# Patient Record
Sex: FEMALE | Race: WHITE | Employment: UNEMPLOYED | URBAN - NONMETROPOLITAN AREA
[De-identification: names, ages, dates, MRNs, and addresses within clinical notes are randomized per-mention and may not be internally consistent; named-entity substitution may affect disease eponyms.]

---

## 2022-07-22 ENCOUNTER — APPOINTMENT (OUTPATIENT)
Dept: CT IMAGING | Age: 42
End: 2022-07-22
Payer: COMMERCIAL

## 2022-07-22 ENCOUNTER — HOSPITAL ENCOUNTER (EMERGENCY)
Age: 42
Discharge: HOME | End: 2022-07-22
Attending: EMERGENCY MEDICINE
Payer: COMMERCIAL

## 2022-07-22 VITALS
TEMPERATURE: 97.7 F | SYSTOLIC BLOOD PRESSURE: 119 MMHG | DIASTOLIC BLOOD PRESSURE: 78 MMHG | OXYGEN SATURATION: 98 % | RESPIRATION RATE: 15 BRPM | HEART RATE: 72 BPM

## 2022-07-22 DIAGNOSIS — S09.90XA INJURY OF HEAD, INITIAL ENCOUNTER: Primary | ICD-10-CM

## 2022-07-22 DIAGNOSIS — S16.1XXA STRAIN OF NECK MUSCLE, INITIAL ENCOUNTER: ICD-10-CM

## 2022-07-22 DIAGNOSIS — S39.012A BACK STRAIN, INITIAL ENCOUNTER: ICD-10-CM

## 2022-07-22 PROCEDURE — 96375 TX/PRO/DX INJ NEW DRUG ADDON: CPT

## 2022-07-22 PROCEDURE — 72131 CT LUMBAR SPINE W/O DYE: CPT

## 2022-07-22 PROCEDURE — 72125 CT NECK SPINE W/O DYE: CPT

## 2022-07-22 PROCEDURE — 72192 CT PELVIS W/O DYE: CPT

## 2022-07-22 PROCEDURE — 70450 CT HEAD/BRAIN W/O DYE: CPT

## 2022-07-22 PROCEDURE — 6360000002 HC RX W HCPCS: Performed by: EMERGENCY MEDICINE

## 2022-07-22 PROCEDURE — 72128 CT CHEST SPINE W/O DYE: CPT

## 2022-07-22 PROCEDURE — 96374 THER/PROPH/DIAG INJ IV PUSH: CPT

## 2022-07-22 PROCEDURE — 99284 EMERGENCY DEPT VISIT MOD MDM: CPT

## 2022-07-22 RX ORDER — ONDANSETRON 2 MG/ML
4 INJECTION INTRAMUSCULAR; INTRAVENOUS ONCE
Status: COMPLETED | OUTPATIENT
Start: 2022-07-22 | End: 2022-07-22

## 2022-07-22 RX ORDER — GABAPENTIN 600 MG/1
600 TABLET ORAL 3 TIMES DAILY
COMMUNITY

## 2022-07-22 RX ORDER — FENTANYL CITRATE 50 UG/ML
25 INJECTION, SOLUTION INTRAMUSCULAR; INTRAVENOUS ONCE
Status: COMPLETED | OUTPATIENT
Start: 2022-07-22 | End: 2022-07-22

## 2022-07-22 RX ADMIN — FENTANYL CITRATE 25 MCG: 50 INJECTION, SOLUTION INTRAMUSCULAR; INTRAVENOUS at 20:40

## 2022-07-22 RX ADMIN — ONDANSETRON 4 MG: 2 INJECTION INTRAMUSCULAR; INTRAVENOUS at 20:40

## 2022-07-22 ASSESSMENT — PAIN SCALES - GENERAL: PAINLEVEL_OUTOF10: 9

## 2022-07-22 ASSESSMENT — PAIN DESCRIPTION - ORIENTATION: ORIENTATION: LOWER

## 2022-07-22 ASSESSMENT — PAIN DESCRIPTION - LOCATION: LOCATION: BACK

## 2022-07-23 NOTE — ED PROVIDER NOTES
CHOLECYSTECTOMY      CYST REMOVAL      HYSTERECTOMY (CERVIX STATUS UNKNOWN)           CURRENT MEDICATIONS       Discharge Medication List as of 7/22/2022 10:43 PM        CONTINUE these medications which have NOT CHANGED    Details   gabapentin (NEURONTIN) 600 MG tablet Take 600 mg by mouth in the morning and 600 mg at noon and 600 mg before bedtime. Historical Med             ALLERGIES     Patient has no known allergies. FAMILY HISTORY     History reviewed. No pertinent family history. SOCIAL HISTORY       Social History     Socioeconomic History    Marital status: Single     Spouse name: None    Number of children: None    Years of education: None    Highest education level: None   Tobacco Use    Smoking status: Former     Types: Cigarettes       SCREENINGS        Elmer Coma Scale  Eye Opening: Spontaneous  Best Verbal Response: Oriented  Best Motor Response: Obeys commands  Loving Coma Scale Score: 15               PHYSICAL EXAM    (up to 7 for level 4, 8 or more for level 5)     ED Triage Vitals [07/22/22 2016]   BP Temp Temp Source Heart Rate Resp SpO2 Height Weight   119/78 97.7 °F (36.5 °C) Tympanic 72 15 98 % -- --       Physical Exam  Vitals and nursing note reviewed. Constitutional:       Appearance: Normal appearance. HENT:      Head: Normocephalic. Comments: Negative summers signs negative raccoon's eyes    No otorhinorrhea     Nose: Nose normal.      Mouth/Throat:      Mouth: Mucous membranes are moist.      Comments: No airway compromise  Eyes:      Extraocular Movements: Extraocular movements intact. Pupils: Pupils are equal, round, and reactive to light.       Comments: No photophobia   Neck:      Comments: Patient is fully immobilized with a hard cervical collar in place    After the patient's collar was removed and cleared from CT cervical spine    Cervical spine some nonspecific tenderness noted neck is without JVD the trachea is midline no subcu crepitus  Cardiovascular:      Rate and Rhythm: Normal rate and regular rhythm. Pulses: Normal pulses. Heart sounds: Normal heart sounds. Pulmonary:      Effort: Pulmonary effort is normal.      Breath sounds: Normal breath sounds. Comments: Chest wall is without appreciable tenderness, no subcu crepitus  Abdominal:      General: Abdomen is flat. Palpations: Abdomen is soft. There is no mass. Tenderness: There is no abdominal tenderness. Hernia: No hernia is present. Musculoskeletal:      Right lower leg: No edema. Left lower leg: No edema. Comments: Dorsal spine nonspecific tenderness lumbar spine nonspecific tenderness sacral spine nonspecific tenderness without step-off defect pattern   Skin:     General: Skin is warm. Capillary Refill: Capillary refill takes less than 2 seconds. Findings: No lesion or rash. Neurological:      General: No focal deficit present. Mental Status: She is alert and oriented to person, place, and time. Psychiatric:         Mood and Affect: Mood normal.       DIAGNOSTIC RESULTS     EKG: All EKG's are interpreted by the Emergency Department Physician who either signs or Co-signs this chart in the absence of a cardiologist.      RADIOLOGY:   Non-plain film images such as CT, Ultrasound and MRI are read by the radiologist. Plain radiographic images are visualized and preliminarily interpreted by the emergency physician with the below findings:      Interpretation per the Radiologist below, if available at the time of this note:    CT Head W/O Contrast   Final Result   Head CT: No acute intracranial abnormality. No evidence for acute   intracranial hemorrhage, territorial infarction or intracranial mass lesion. Cervical CT: No acute abnormality of the cervical spine. No fracture. Thoracic CT: No acute osseous abnormality of thoracic spine. No fracture. Lumbar CT: No acute osseous abnormality of lumbar spine.   No fracture. CT of the pelvis: No acute traumatic injury within the pelvis. CT THORACIC SPINE WO CONTRAST   Final Result   Head CT: No acute intracranial abnormality. No evidence for acute   intracranial hemorrhage, territorial infarction or intracranial mass lesion. Cervical CT: No acute abnormality of the cervical spine. No fracture. Thoracic CT: No acute osseous abnormality of thoracic spine. No fracture. Lumbar CT: No acute osseous abnormality of lumbar spine. No fracture. CT of the pelvis: No acute traumatic injury within the pelvis. CT LUMBAR SPINE WO CONTRAST   Final Result   Head CT: No acute intracranial abnormality. No evidence for acute   intracranial hemorrhage, territorial infarction or intracranial mass lesion. Cervical CT: No acute abnormality of the cervical spine. No fracture. Thoracic CT: No acute osseous abnormality of thoracic spine. No fracture. Lumbar CT: No acute osseous abnormality of lumbar spine. No fracture. CT of the pelvis: No acute traumatic injury within the pelvis. CT PELVIS WO CONTRAST Additional Contrast? None   Final Result   Head CT: No acute intracranial abnormality. No evidence for acute   intracranial hemorrhage, territorial infarction or intracranial mass lesion. Cervical CT: No acute abnormality of the cervical spine. No fracture. Thoracic CT: No acute osseous abnormality of thoracic spine. No fracture. Lumbar CT: No acute osseous abnormality of lumbar spine. No fracture. CT of the pelvis: No acute traumatic injury within the pelvis. CT CERVICAL SPINE WO CONTRAST   Final Result   Head CT: No acute intracranial abnormality. No evidence for acute   intracranial hemorrhage, territorial infarction or intracranial mass lesion. Cervical CT: No acute abnormality of the cervical spine. No fracture. Thoracic CT: No acute osseous abnormality of thoracic spine.   No fracture. Lumbar CT: No acute osseous abnormality of lumbar spine. No fracture. CT of the pelvis: No acute traumatic injury within the pelvis. ED BEDSIDE ULTRASOUND:   Performed by ED Physician - none    LABS:  Labs Reviewed - No data to display    All other labs were within normal range or not returned as of this dictation. EMERGENCY DEPARTMENT COURSE and DIFFERENTIAL DIAGNOSIS/MDM:   Vitals:    Vitals:    07/22/22 2016   BP: 119/78   Pulse: 72   Resp: 15   Temp: 97.7 °F (36.5 °C)   TempSrc: Tympanic   SpO2: 98%         MDM  Number of Diagnoses or Management Options  Back strain, initial encounter  Injury of head, initial encounter  Strain of neck muscle, initial encounter  Diagnosis management comments: 42-year-old female presents emergency department with concerns as documented in HPI-    Diagnosis considerations-close head injury, strain paracervical, strain parathoracic paralumbar, transient numbness left leg    Imaging studies have been reviewed discussed with the patient and the importance of timely follow-up    Patient able to ambulate without difficulty paresthesias have resolved and her left leg    Patient acknowledges discharge diagnosis the importance of timely follow-up have no additional questions or concerns released in improved condition          REASSESSMENT   Patient able to ambulate without difficulty neurologically she is [id number] stable well oxygenated with pulse oximeter 98% on room air    ED Course as of 07/23/22 0356   Fri Jul 22, 2022 2238 CT Head W/O Contrast  CT patient's brain, cervical spine, thoracic spine, lumbar spine, CT pelvis no acute osseous process radiologist read [RS]      ED Course User Index  [RS] Kathy Ramos MD         CRITICAL CARE TIME   Total Critical Care time was minutes, excluding separately reportable procedures.   There was a high probability of clinically significant/life threatening deterioration in the patient's condition which required my urgent intervention. CONSULTS:  None    PROCEDURES:  Unless otherwise noted below, none     Procedures    FINAL IMPRESSION      1. Injury of head, initial encounter    2. Strain of neck muscle, initial encounter    3. Back strain, initial encounter          DISPOSITION/PLAN   DISPOSITION Decision To Discharge 07/22/2022 10:40:24 PM      PATIENT REFERRED TO:  [address]  [phone]  Call in 3 days      DISCHARGE MEDICATIONS:  Discharge Medication List as of 7/22/2022 10:43 PM        Controlled Substances Monitoring:     No flowsheet data found.     (Please note that portions of this note were completed with a voice recognition program.  Efforts were made to edit the dictations but occasionally words are mis-transcribed.)    Anuel Brock MD (electronically signed)  Attending Emergency Physician            Anuel Brock MD  07/23/22 0328